# Patient Record
Sex: MALE | Race: WHITE | Employment: OTHER | URBAN - METROPOLITAN AREA
[De-identification: names, ages, dates, MRNs, and addresses within clinical notes are randomized per-mention and may not be internally consistent; named-entity substitution may affect disease eponyms.]

---

## 2017-06-06 ENCOUNTER — OFFICE VISIT (OUTPATIENT)
Dept: UROLOGY | Age: 77
End: 2017-06-06

## 2017-06-06 ENCOUNTER — HOSPITAL ENCOUNTER (OUTPATIENT)
Dept: LAB | Age: 77
Discharge: HOME | End: 2017-06-06
Payer: MEDICARE

## 2017-06-06 VITALS
DIASTOLIC BLOOD PRESSURE: 80 MMHG | BODY MASS INDEX: 42.38 KG/M2 | WEIGHT: 270 LBS | OXYGEN SATURATION: 97 % | HEART RATE: 79 BPM | TEMPERATURE: 98 F | HEIGHT: 67 IN | SYSTOLIC BLOOD PRESSURE: 110 MMHG

## 2017-06-06 DIAGNOSIS — R35.1 BPH ASSOCIATED WITH NOCTURIA: Primary | ICD-10-CM

## 2017-06-06 DIAGNOSIS — R31.0 GROSS HEMATURIA: ICD-10-CM

## 2017-06-06 DIAGNOSIS — N40.1 BPH ASSOCIATED WITH NOCTURIA: Primary | ICD-10-CM

## 2017-06-06 DIAGNOSIS — R35.1 BPH ASSOCIATED WITH NOCTURIA: ICD-10-CM

## 2017-06-06 DIAGNOSIS — N40.1 BPH ASSOCIATED WITH NOCTURIA: ICD-10-CM

## 2017-06-06 LAB
ERYTHROCYTE [DISTWIDTH] IN BLOOD BY AUTOMATED COUNT: 13.9 % (ref 11.6–14.5)
HCT VFR BLD AUTO: 43.8 % (ref 36–48)
HGB BLD-MCNC: 14.3 G/DL (ref 13–16)
MCH RBC QN AUTO: 31.2 PG (ref 24–34)
MCHC RBC AUTO-ENTMCNC: 32.6 G/DL (ref 31–37)
MCV RBC AUTO: 95.4 FL (ref 74–97)
PLATELET # BLD AUTO: 255 K/UL (ref 135–420)
PMV BLD AUTO: 10.6 FL (ref 9.2–11.8)
PSA SERPL-MCNC: 0.4 NG/ML (ref 0–4)
RBC # BLD AUTO: 4.59 M/UL (ref 4.7–5.5)
WBC # BLD AUTO: 6.4 K/UL (ref 4.6–13.2)

## 2017-06-06 PROCEDURE — 84153 ASSAY OF PSA TOTAL: CPT | Performed by: UROLOGY

## 2017-06-06 PROCEDURE — 85027 COMPLETE CBC AUTOMATED: CPT | Performed by: UROLOGY

## 2017-06-06 PROCEDURE — 87086 URINE CULTURE/COLONY COUNT: CPT | Performed by: UROLOGY

## 2017-06-06 NOTE — PROGRESS NOTES
Mr. Dann Ahuja has a reminder for a \"due or due soon\" health maintenance. I have asked that he contact his primary care provider for follow-up on this health maintenance.

## 2017-06-06 NOTE — PROGRESS NOTES
Earle Tipton. 77 y.o. male     Mr. Malika Friedman seen today for evaluation of gross hematuria-on Coumadin for coronary artery disease, peripheral vascular disease, and chronic atrial fibrillation  Patient has burgundy colored urine for the past 3 days-no urgency frequency or dysuria no fever flank pain    Similar to gross hematuria experienced in 2016 with CT scan in July 2016 showing normal kidneys and ureters and cystoscopy showing ingested and friable blood vessels in the bladder outle  Abdominal aneurysm found at that time subsequently stented in November 2016 by Dr. Shad Wells    On alpha-blocker treatment with Flomax for relief of symptomatic BPH       symptomatic BPH initiating alpha-blocker therapy 3 months ago after evaluation of gross hematuria finding an abdominal aortic aneurysm in bilateral iliac aneurysms on CT scan imaging-refer to vascular surgery which has stented the abnormal blood vessels-patient is anticoagulated with Coumadin  Reports forceful urinary stream with good control-nocturia has subsided entirely-very happy with alpha-blocker treatment  CT scan imaging of the abdomen and pelvis with 7 July 2016 shows normal kidneys ureters and bladder-saccular aneurysms of the abdominal aorta and common iliac arteries are noted incidentally-Images have been reviewed today with the patient on PACS-Downloaded for scanning into the electronic medical record-laser printed for the patient      Patient has no symptoms of claudication-no episodes of abdominal pain      Creatinine 0.7 on 7 July 2016  PSA 0.5 in March 2016      Review of Systems:    CNS: no seizure syncope headaches or dizziness  Respiratory: wheezing no shortness of breath/COPD  Cardiovascular:coronary artery disease/hypertension  Intestinal:GERD no diarrhea no constipation  Urinary: urinary urgency and frequency  Skeletal: large joint arthritis  Endocrine:diabetes x20 years  Other:massive obesity                                     Nonsmoker x25 years                    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  tamsulosin (FLOMAX) 0.4 mg capsule Take 0.4 mg by mouth daily.  furosemide (LASIX) 40 mg tablet 40 mg.      lisinopril (PRINIVIL, ZESTRIL) 10 mg tablet 10 mg.      isosorbide mononitrate ER (IMDUR) 60 mg CR tablet 60 mg.      citalopram (CELEXA) 20 mg tablet   0    pravastatin (PRAVACHOL) 40 mg tablet 40 mg.      ranitidine (ZANTAC) 150 mg tablet   0    celecoxib (CELEBREX) 200 mg capsule 200 mg.      gemfibrozil (LOPID) 600 mg tablet 600 mg.      metFORMIN (GLUCOPHAGE) 1,000 mg tablet 1,000 mg.      metoprolol tartrate (LOPRESSOR) 25 mg tablet 25 mg.      gabapentin (NEURONTIN) 300 mg capsule Take 2 Caps by Mouth Every Night at Bedtime      warfarin (COUMADIN) 5 mg tablet       albuterol (PROVENTIL HFA, VENTOLIN HFA, PROAIR HFA) 90 mcg/actuation inhaler 2 Puffs.  multivitamin (ONE A DAY) tablet Take 1 Tab by mouth daily.  omega-3s-dha-epa-fish oil 300-1,000 mg cap Take  by mouth.  zinc 50 mg tab tablet Take  by mouth daily.  ascorbic acid, vitamin C, (VITAMIN C) 500 mg tablet Take 1,000 mg by mouth.  HYDROcodone-acetaminophen (NORCO) 5-325 mg per tablet Take 1 Tab by mouth every four (4) hours as needed. Max Daily Amount: 6 Tabs.  36 Tab 0       Past Medical History:   Diagnosis Date    CAD (coronary artery disease)     [facility]     Diabetes ([facility]     Heart failure (HCC)     CHF    Hypertension     Sleep apnea     cpap    Thromboembolus ([facility]     Leg after sx-1991      Past Surgical History:   Procedure Laterality Date    CARDIAC SURG PROCEDURE UNLIST      CABGx4 1991    CHEST SURGERY PROCEDURE UNLISTED      HX OTHER SURGICAL      3 stents cardiac 1996    PELVIS/HIP JOINT SURGERY UNLISTED       Family History   Problem Relation Age of Onset    Heart Disease Father     Hypertension Father     Cancer Sister         Physical Examination: Markedly overweight mature male in no apparent distress    Abdomen is nontender  Back-no percussion CVA tenderness      Urinalysis: Dark burgundy colored urine/++++heme    Impression: Gross painless hematuria on Coumadin anticoagulation                        -Symptomatic BPH responding favorably to alpha-blocker                                Plan: INR today             PSA/CBC today             C&S urine today    Advised light physical activity    rtc 1 week-may need endoscopic fulguration of bleeding sites gross hematuria persists/discussed with patient      More than 1/2 of this 25 minute visit was spent in counselling and coordination of care, as described above. Camilo Perez MD  -electronically signed-    PLEASE NOTE:  This document has been produced using voice recognition software. Unrecognized errors in transcription may be present.

## 2017-06-06 NOTE — PATIENT INSTRUCTIONS

## 2017-06-06 NOTE — MR AVS SNAPSHOT
Visit Information Date & Time Provider Department Dept. Phone Encounter #  
 6/6/2017 10:00 AM Camilo Perez, [address] [id number] Your Appointments 10/25/2017  9:45 AM  
Office Visit with Camilo Perez MD  
[facility] [address]) Appt Note: check up [address] [facility] [address] [address] [phone] [address] [facility] [address] Upcoming Health Maintenance Date Due  
 LIPID PANEL Q1 1940 FOOT EXAM Q1 8/29/1950 MICROALBUMIN Q1 8/29/1950 EYE EXAM RETINAL OR DILATED Q1 8/29/1951 DTaP/Tdap/Td series (1 - Tdap) 8/29/1962 ZOSTER VACCINE AGE 60> 8/29/2001 GLAUCOMA SCREENING Q2Y 8/29/2006 Pneumococcal 65+ Low/Medium Risk (1 of 2 - PCV13) 8/29/2006 MEDICARE YEARLY EXAM 8/29/2006 HEMOGLOBIN A1C Q6M 3/9/2017 INFLUENZA AGE 9 TO ADULT 8/1/2017 Allergies as of 6/6/2017  Review Complete On: 6/6/2017 By: Jacquelyne Frankel Severity Noted Reaction Type Reactions Latex, Natural Rubber Medium 06/30/2016    Itching Penicillins High 06/30/2016    Hives, Rash, Swelling Oxycodone Medium 06/30/2016    Other (comments) ''Makes real loopy'' Aspirin  06/30/2016    Other (comments)  
 '' Taking coumadin or Warfarin\" Current Immunizations  Never Reviewed No immunizations on file. Not reviewed this visit You Were Diagnosed With   
  
 Codes Comments BPH associated with nocturia    -  Primary ICD-10-CM: N40.1, R35.1 ICD-9-CM: 600.01, 788.43 Gross hematuria     ICD-10-CM: R31.0 ICD-9-CM: 599.71 Vitals BP Pulse Temp Height(growth percentile) Weight(growth percentile) SpO2  
 110/80 (BP 1 Location: Left arm, BP Patient Position: Sitting) 79 98 °F (36.7 °C) (Oral) 5' 7\" (1.702 m) 270 lb (122.5 kg) 97% BMI Smoking Status 42.29 kg/m2 Former Smoker BMI and BSA Data Body Mass Index Body Surface Area  
 42.29 kg/m 2 2.41 m 2 Preferred Pharmacy Pharmacy Name Phone [facility] [address] [id number]) [phone] Your Updated Medication List  
  
   
This list is accurate as of: 6/6/17 10:46 AM.  Always use your most recent med list.  
  
  
  
  
 albuterol 90 mcg/actuation inhaler Commonly known as:  PROVENTIL HFA, VENTOLIN HFA, PROAIR HFA  
2 Puffs. ascorbic acid (vitamin C) 500 mg tablet Commonly known as:  VITAMIN C Take 1,000 mg by mouth. CARDIZEM  mg ER capsule Generic drug:  dilTIAZem CD Take 180 mg by mouth daily. celecoxib 200 mg capsule Commonly known as:  CELEBREX  
200 mg.  
  
 citalopram 20 mg tablet Commonly known as:  CELEXA  
  
 furosemide 40 mg tablet Commonly known as:  LASIX 40 mg.  
  
 gabapentin 300 mg capsule Commonly known as:  NEURONTIN Take 2 Caps by Mouth Every Night at Bedtime  
  
 gemfibrozil 600 mg tablet Commonly known as:  LOPID  
600 mg. HYDROcodone-acetaminophen 5-325 mg per tablet Commonly known as:  [place] Take 1 Tab by mouth every four (4) hours as needed. Max Daily Amount: 6 Tabs. isosorbide mononitrate ER 60 mg CR tablet Commonly known as:  IMDUR  
60 mg.  
  
 lisinopril 10 mg tablet Commonly known as:  PRINIVIL, ZESTRIL  
10 mg.  
  
 metFORMIN 1,000 mg tablet Commonly known as:  GLUCOPHAGE  
1,000 mg.  
  
 metoprolol tartrate 25 mg tablet Commonly known as:  LOPRESSOR  
25 mg.  
  
 multivitamin tablet Commonly known as:  ONE A DAY Take 1 Tab by mouth daily. omega-3s-dha-epa-fish oil 300-1,000 mg Cap Take  by mouth.  
  
 pravastatin 40 mg tablet Commonly known as:  PRAVACHOL 40 mg.  
  
 raNITIdine 150 mg tablet Commonly known as:  ZANTAC  
  
 tamsulosin 0.4 mg capsule Commonly known as:  FLOMAX Take 0.4 mg by mouth daily. warfarin 5 mg tablet Commonly known as:  COUMADIN  
  
 zinc 50 mg Tab tablet Take  by mouth daily. We Performed the Following ME COLLECTION VENOUS BLOOD,VENIPUNCTURE [id number] CPT(R)] To-Do List   
 06/06/2017 Lab:  CBC W/O DIFF   
  
 06/06/2017 Lab:  PROSTATE SPECIFIC AG (PSA) Patient Instructions Blood in the Urine: Care Instructions Your Care Instructions Blood in the urine, or hematuria, may make the urine look red, brown, or pink. There may be blood every time you urinate or just from time to time. You cannot always see blood in the urine, but it will show up in a urine test. 
Blood in the urine may be serious. It should always be checked by a doctor. Your doctor may recommend more tests, including an X-ray, a CT scan, or a cystoscopy (which lets a doctor look inside the urethra and bladder). Blood in the urine can be a sign of another problem. Common causes are bladder infections and kidney stones. An injury to your groin or your genital area can also cause bleeding in the urinary tract. Very hard exercisesuch as running a marathoncan cause blood in the urine. Blood in the urine can also be a sign of kidney disease or cancer in the bladder or kidney. Many cases of blood in the urine are caused by a harmless condition that runs in families. This is called benign familial hematuria. It does not need any treatment. Sometimes your urine may look red or brown even though it does not contain blood. For example, not getting enough fluids (dehydration), taking certain medicines, or having a liver problem can change the color of your urine. Eating foods such as beets, rhubarb, or blackberries or foods with red food coloring can make your urine look red or pink. Follow-up care is a key part of your treatment and safety. Be sure to make and go to all appointments, and call your doctor if you are having problems.  It's also a good idea to know your test results and keep a list of the medicines you take. When should you call for help? Call your doctor now or seek immediate medical care if: 
· You have symptoms of a urinary infection. For example: ¨ You have pus in your urine. ¨ You have pain in your back just below your rib cage. This is called flank pain. ¨ You have a fever, chills, or body aches. ¨ It hurts to urinate. ¨ You have groin or belly pain. · You have more blood in your urine. Watch closely for changes in your health, and be sure to contact your doctor if: 
· You have new urination problems. · You do not get better as expected. Where can you learn more? Go to http://jasmin-jose juan.info/. Enter C517 in the search box to learn more about \"Blood in the Urine: Care Instructions. \" Current as of: August 12, 2016 Content Version: 11.2 © 4951-9527 goodideazs. Care instructions adapted under license by [employer] (which disclaims liability or warranty for this information). If you have questions about a medical condition or this instruction, always ask your healthcare professional. [place] [address] any warranty or liability for your use of this information. Please provide this summary of care documentation to your next provider. Your primary care clinician is listed as Ab Connor. If you have any questions after today's visit, please call [phone].

## 2017-06-08 LAB
BACTERIA SPEC CULT: NORMAL
SERVICE CMNT-IMP: NORMAL

## 2017-06-13 ENCOUNTER — OFFICE VISIT (OUTPATIENT)
Dept: UROLOGY | Age: 77
End: 2017-06-13

## 2017-06-13 VITALS
WEIGHT: 270 LBS | BODY MASS INDEX: 42.38 KG/M2 | TEMPERATURE: 97.5 F | SYSTOLIC BLOOD PRESSURE: 132 MMHG | OXYGEN SATURATION: 95 % | HEIGHT: 67 IN | HEART RATE: 57 BPM | DIASTOLIC BLOOD PRESSURE: 70 MMHG

## 2017-06-13 DIAGNOSIS — N40.1 BPH ASSOCIATED WITH NOCTURIA: Primary | ICD-10-CM

## 2017-06-13 DIAGNOSIS — R35.1 BPH ASSOCIATED WITH NOCTURIA: Primary | ICD-10-CM

## 2017-06-13 DIAGNOSIS — R31.0 GROSS HEMATURIA: ICD-10-CM

## 2017-06-13 LAB
BILIRUB UR QL STRIP: NEGATIVE
GLUCOSE UR-MCNC: NEGATIVE MG/DL
KETONES P FAST UR STRIP-MCNC: NEGATIVE MG/DL
PH UR STRIP: 6.5 [PH] (ref 4.6–8)
PROT UR QL STRIP: NEGATIVE MG/DL
SP GR UR STRIP: 1.02 (ref 1–1.03)
UA UROBILINOGEN AMB POC: NORMAL (ref 0.2–1)
URINALYSIS CLARITY POC: CLEAR
URINALYSIS COLOR POC: YELLOW
URINE BLOOD POC: NEGATIVE
URINE LEUKOCYTES POC: NEGATIVE
URINE NITRITES POC: NEGATIVE

## 2017-06-13 RX ORDER — DILTIAZEM HYDROCHLORIDE 180 MG/1
TABLET, EXTENDED RELEASE ORAL
Refills: 3 | COMMUNITY
Start: 2017-04-29

## 2017-06-13 RX ORDER — TAMSULOSIN HYDROCHLORIDE 0.4 MG/1
0.4 CAPSULE ORAL DAILY
Qty: 90 CAP | Refills: 3 | Status: SHIPPED | OUTPATIENT
Start: 2017-06-13

## 2017-06-13 NOTE — PROGRESS NOTES
Mr. Brianna Bro has a reminder for a \"due or due soon\" health maintenance. I have asked that he contact his primary care provider for follow-up on this health maintenance.

## 2017-06-13 NOTE — PATIENT INSTRUCTIONS

## 2017-06-13 NOTE — MR AVS SNAPSHOT
Visit Information Date & Time Provider Department Dept. Phone Encounter #  
 6/13/2017  9:45 AM Pratik Michael, [facility] [phone] [id number] Your Appointments 10/25/2017  9:45 AM  
Office Visit with Pratik Michael MD  
[facility] [address]) Appt Note: check up [address] [facility] [address] [address]  
[phone] [address] [facility] [address] Upcoming Health Maintenance Date Due  
 LIPID PANEL Q1 1940 FOOT EXAM Q1 8/29/1950 MICROALBUMIN Q1 8/29/1950 EYE EXAM RETINAL OR DILATED Q1 8/29/1951 DTaP/Tdap/Td series (1 - Tdap) 8/29/1962 ZOSTER VACCINE AGE 60> 8/29/2001 GLAUCOMA SCREENING Q2Y 8/29/2006 Pneumococcal 65+ Low/Medium Risk (1 of 2 - PCV13) 8/29/2006 MEDICARE YEARLY EXAM 8/29/2006 HEMOGLOBIN A1C Q6M 3/9/2017 INFLUENZA AGE 9 TO ADULT 8/1/2017 Allergies as of 6/13/2017  Review Complete On: 6/13/2017 By: Pratik Michael MD  
  
 Severity Noted Reaction Type Reactions Latex, Natural Rubber Medium 06/30/2016    Itching Penicillins High 06/30/2016    Hives, Rash, Swelling Oxycodone Medium 06/30/2016    Other (comments) ''Makes real loopy'' Aspirin  06/30/2016    Other (comments) Other reaction(s): unknown Pt is taking coumadin 
'' Taking coumadin or Warfarin\" Current Immunizations  Never Reviewed No immunizations on file. Not reviewed this visit You Were Diagnosed With   
  
 Codes Comments BPH associated with nocturia    -  Primary ICD-10-CM: N40.1, R35.1 ICD-9-CM: 600.01, 788.43 Gross hematuria     ICD-10-CM: R31.0 ICD-9-CM: 599.71 Vitals BP Pulse Temp Height(growth percentile) Weight(growth percentile) SpO2  
 132/70 (BP 1 Location: Left arm, BP Patient Position: Sitting) (!) 57 97.5 °F (36.4 °C) 5' 7\" (1.702 m) 270 lb (122.5 kg) 95% BMI Smoking Status 42.29 kg/m2 Former Smoker BMI and BSA Data Body Mass Index Body Surface Area  
 42.29 kg/m 2 2.41 m 2 Preferred Pharmacy Pharmacy Name Phone [facility] [address]) [phone] Your Updated Medication List  
  
   
This list is accurate as of: 6/13/17 10:40 AM.  Always use your most recent med list.  
  
  
  
  
 albuterol 90 mcg/actuation inhaler Commonly known as:  PROVENTIL HFA, VENTOLIN HFA, PROAIR HFA  
2 Puffs. ascorbic acid (vitamin C) 500 mg tablet Commonly known as:  VITAMIN C Take 1,000 mg by mouth. * CARDIZEM  mg ER capsule Generic drug:  dilTIAZem CD Take 180 mg by mouth daily. * MATZIM  mg Tb24 tablet Generic drug:  dilTIAZem ER TK 1 T PO QD  
  
 celecoxib 200 mg capsule Commonly known as:  CELEBREX  
200 mg.  
  
 citalopram 20 mg tablet Commonly known as:  CELEXA  
  
 furosemide 40 mg tablet Commonly known as:  LASIX 40 mg.  
  
 gabapentin 300 mg capsule Commonly known as:  NEURONTIN Take 2 Caps by Mouth Every Night at Bedtime  
  
 gemfibrozil 600 mg tablet Commonly known as:  LOPID  
600 mg. HYDROcodone-acetaminophen 5-325 mg per tablet Commonly known as:  [address] Take 1 Tab by mouth every four (4) hours as needed. Max Daily Amount: 6 Tabs. isosorbide mononitrate ER 60 mg CR tablet Commonly known as:  IMDUR  
60 mg.  
  
 lisinopril 10 mg tablet Commonly known as:  PRINIVIL, ZESTRIL  
10 mg.  
  
 metFORMIN 1,000 mg tablet Commonly known as:  GLUCOPHAGE  
1,000 mg.  
  
 metoprolol tartrate 25 mg tablet Commonly known as:  LOPRESSOR  
25 mg.  
  
 multivitamin tablet Commonly known as:  ONE A DAY Take 1 Tab by mouth daily. omega-3s-dha-epa-fish oil 300-1,000 mg Cap Take  by mouth.  
  
 pravastatin 40 mg tablet Commonly known as:  PRAVACHOL 40 mg.  
  
 raNITIdine 150 mg tablet Commonly known as:  ZANTAC * tamsulosin 0.4 mg capsule Commonly known as:  FLOMAX Take 0.4 mg by mouth daily. * tamsulosin 0.4 mg capsule Commonly known as:  FLOMAX Take 1 Cap by mouth daily. Indications: SYMPTOMATIC BENIGN PROSTATIC HYPERPLASIA  
  
 warfarin 5 mg tablet Commonly known as:  COUMADIN  
  
 zinc 50 mg Tab tablet Take  by mouth daily. * Notice: This list has 4 medication(s) that are the same as other medications prescribed for you. Read the directions carefully, and ask your doctor or other care provider to review them with you. Prescriptions Sent to Pharmacy Refills  
 tamsulosin (FLOMAX) 0.4 mg capsule 3 Sig: Take 1 Cap by mouth daily. Indications: SYMPTOMATIC BENIGN PROSTATIC HYPERPLASIA Class: Normal  
 Pharmacy: [facility] [address] #: [phone] Route: Oral  
  
We Performed the Following AMB POC URINALYSIS DIP STICK AUTO W/O MICRO [25423 CPT(R)] Patient Instructions Blood in the Urine: Care Instructions Your Care Instructions Blood in the urine, or hematuria, may make the urine look red, brown, or pink. There may be blood every time you urinate or just from time to time. You cannot always see blood in the urine, but it will show up in a urine test. 
Blood in the urine may be serious. It should always be checked by a doctor. Your doctor may recommend more tests, including an X-ray, a CT scan, or a cystoscopy (which lets a doctor look inside the urethra and bladder). Blood in the urine can be a sign of another problem. Common causes are bladder infections and kidney stones. An injury to your groin or your genital area can also cause bleeding in the urinary tract. Very hard exercisesuch as running a marathoncan cause blood in the urine.  Blood in the urine can also be a sign of kidney disease or cancer in the bladder or kidney. Many cases of blood in the urine are caused by a harmless condition that runs in families. This is called benign familial hematuria. It does not need any treatment. Sometimes your urine may look red or brown even though it does not contain blood. For example, not getting enough fluids (dehydration), taking certain medicines, or having a liver problem can change the color of your urine. Eating foods such as beets, rhubarb, or blackberries or foods with red food coloring can make your urine look red or pink. Follow-up care is a key part of your treatment and safety. Be sure to make and go to all appointments, and call your doctor if you are having problems. It's also a good idea to know your test results and keep a list of the medicines you take. When should you call for help? Call your doctor now or seek immediate medical care if: 
· You have symptoms of a urinary infection. For example: ¨ You have pus in your urine. ¨ You have pain in your back just below your rib cage. This is called flank pain. ¨ You have a fever, chills, or body aches. ¨ It hurts to urinate. ¨ You have groin or belly pain. · You have more blood in your urine. Watch closely for changes in your health, and be sure to contact your doctor if: 
· You have new urination problems. · You do not get better as expected. Where can you learn more? Go to http://jasmin-jose juan.info/. Enter D145 in the search box to learn more about \"Blood in the Urine: Care Instructions. \" Current as of: August 12, 2016 Content Version: 11.2 © 1577-8566 MovieSet. Care instructions adapted under license by [employer] (which disclaims liability or warranty for this information). If you have questions about a medical condition or this instruction, always ask your healthcare professional. [address] any warranty or liability for your use of this information. Please provide this summary of care documentation to your next provider. Your primary care clinician is listed as Flores English. If you have any questions after today's visit, please call [phone].

## 2017-06-14 NOTE — PROGRESS NOTES
Chance Paul. 77 y.o. male     Mr. Freddy Martinez seen today for follow-up gross hematuria on Coumadin anticoagulation  Patient's INR has now been decreased to 2.6/INR 7.7 when experiencing gross hematuria    Patient is voiding grossly clear urine at this time  gross hematuria-on Coumadin for coronary artery disease, peripheral vascular disease, and chronic atrial fibrillation  Patient has burgundy colored urine for the past 3 days-no urgency frequency or dysuria no fever flank pain     Similar to gross hematuria experienced in 2016 with CT scan in July 2016 showing normal kidneys and ureters and cystoscopy showing ingested and friable blood vessels in the bladder outle  Abdominal aneurysm found at that time subsequently stented in November 2016 by Dr. Clarissa Narayanan     On alpha-blocker treatment with Flomax for relief of symptomatic BPH         symptomatic BPH initiating alpha-blocker therapy 3 months ago after evaluation of gross hematuria finding an abdominal aortic aneurysm in bilateral iliac aneurysms on CT scan imaging-refer to vascular surgery which has stented the abnormal blood vessels-patient is anticoagulated with Coumadin  Reports forceful urinary stream with good control-nocturia has subsided entirely-very happy with alpha-blocker treatment  CT scan imaging of the abdomen and pelvis with 7 July 2016 shows normal kidneys ureters and bladder-saccular aneurysms of the abdominal aorta and common iliac arteries are noted incidentally-Images have been reviewed today with the patient on PACS-Downloaded for scanning into the electronic medical record-laser printed for the patient      Patient has no symptoms of claudication-no episodes of abdominal pain      Creatinine 0.7 on 7 July 2016  PSA 0.5 in March 2016  PSA 0.4 on 6 June 2017      Review of Systems:    CNS: no seizure syncope headaches or dizziness  Respiratory: wheezing no shortness of breath/COPD  Cardiovascular:coronary artery disease/hypertension  Intestinal:GERD no diarrhea no constipation  Urinary: urinary urgency and frequency  Skeletal: large joint arthritis  Endocrine:diabetes x20 years  Other:massive obesity                                     Nonsmoker x25 years                    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 Indications: SYMPTOMATIC BENIGN PROSTATIC HYPERPLASIA 90 Cap 3    diltiazem CD (CARDIZEM CD) 180 mg ER capsule Take 180 mg by mouth daily.  tamsulosin (FLOMAX) 0.4 mg capsule Take 0.4 mg by mouth daily.  furosemide (LASIX) 40 mg tablet 40 mg.      lisinopril (PRINIVIL, ZESTRIL) 10 mg tablet 10 mg.      isosorbide mononitrate ER (IMDUR) 60 mg CR tablet 60 mg.      citalopram (CELEXA) 20 mg tablet   0    pravastatin (PRAVACHOL) 40 mg tablet 40 mg.      ranitidine (ZANTAC) 150 mg tablet   0    celecoxib (CELEBREX) 200 mg capsule 200 mg.      gemfibrozil (LOPID) 600 mg tablet 600 mg.      metFORMIN (GLUCOPHAGE) 1,000 mg tablet 1,000 mg.      metoprolol tartrate (LOPRESSOR) 25 mg tablet 25 mg.      gabapentin (NEURONTIN) 300 mg capsule Take 2 Caps by Mouth Every Night at Bedtime      warfarin (COUMADIN) 5 mg tablet       multivitamin (ONE A DAY) tablet Take 1 Tab by mouth daily.  omega-3s-dha-epa-fish oil 300-1,000 mg cap Take  by mouth.  zinc 50 mg tab tablet Take  by mouth daily.  ascorbic acid, vitamin C, (VITAMIN C) 500 mg tablet Take 1,000 mg by mouth.       MATZIM  mg Tb24 tablet TK 1 T PO QD  3    HYDROcodone-acetaminophen (NORCO) 5-325 mg per tablet Take 1 Tab by mouth every four (4) hours as needed. Max Daily Amount: 6 Tabs. 40 Tab 0    albuterol (PROVENTIL HFA, VENTOLIN HFA, PROAIR HFA) 90 mcg/actuation inhaler 2 Puffs. Past Medical History:   Diagnosis Date    CAD (coronary artery disease)     [facility] [address]     Diabetes ([facility] [address]     Heart failure (HCC)     CHF    Hypertension     Sleep apnea     cpap    Thromboembolus ([facility] [address]     Leg after sx-1991      Past Surgical History:   Procedure Laterality Date    CARDIAC SURG PROCEDURE UNLIST      CABGx4 1991    CHEST SURGERY PROCEDURE UNLISTED      HX OTHER SURGICAL      3 stents cardiac 1996    PELVIS/HIP JOINT SURGERY UNLISTED       Family History   Problem Relation Age of Onset    Heart Disease Father     Hypertension Father     Cancer Sister         Physical Examination: Well-nourished mature male in no apparent distress    Urinalysis: Negative dipstick/nitrite negative    Impression: Gross hematuria secondary to excessive anticoagulation from Coumadin/corrected with Coumadin dose adjustment                       -Symptomatic BPH responding favorably to alpha-blocker treatment with Flomax      Plan: Flomax 0.4 mg daily      rtc 6 mo VANESSA/PVR/      More than 1/2 of this 15 minute visit was spent in counselling and coordination of care, as described above. Leopoldo Porras MD  -electronically signed-    PLEASE NOTE:  This document has been produced using voice recognition software. Unrecognized errors in transcription may be present.

## 2017-10-25 ENCOUNTER — OFFICE VISIT (OUTPATIENT)
Dept: UROLOGY | Age: 77
End: 2017-10-25

## 2017-10-25 VITALS
DIASTOLIC BLOOD PRESSURE: 73 MMHG | OXYGEN SATURATION: 91 % | HEART RATE: 59 BPM | SYSTOLIC BLOOD PRESSURE: 125 MMHG | HEIGHT: 67 IN | BODY MASS INDEX: 43.47 KG/M2 | WEIGHT: 277 LBS

## 2017-10-25 DIAGNOSIS — N40.1 BPH ASSOCIATED WITH NOCTURIA: Primary | ICD-10-CM

## 2017-10-25 DIAGNOSIS — R35.1 BPH ASSOCIATED WITH NOCTURIA: Primary | ICD-10-CM

## 2017-10-25 RX ORDER — TAMSULOSIN HYDROCHLORIDE 0.4 MG/1
0.4 CAPSULE ORAL DAILY
Qty: 90 CAP | Refills: 3 | Status: SHIPPED | OUTPATIENT
Start: 2017-10-25 | End: 2018-07-12 | Stop reason: SDUPTHER

## 2017-10-25 NOTE — PATIENT INSTRUCTIONS
Benign Prostatic Hyperplasia: Care Instructions  Your Care Instructions    Benign prostatic hyperplasia, or BPH, is an enlarged prostate gland. The prostate is a small gland that makes some of the fluid in semen. Prostate enlargement happens to almost all men as they age. It is usually not serious. BPH does not cause prostate cancer. As the prostate gets bigger, it may partly block the flow of urine. You may have a hard time getting a urine stream started or completely stopped. BPH can cause dribbling. You may have a weak urine stream, or you may have to urinate more often than you used to, especially at night. Most men find these problems easy to manage. You do not need treatment unless your symptoms bother you a lot or you have other problems, such as bladder infections or stones. In these cases, medicines may help. Surgery is not needed unless the urine flow is blocked or the symptoms do not get better with medicine. Follow-up care is a key part of your treatment and safety. Be sure to make and go to all appointments, and call your doctor if you are having problems. It's also a good idea to know your test results and keep a list of the medicines you take. How can you care for yourself at home? · Take plenty of time to urinate. Try to relax. · Try \"double voiding. \" Urinate as much you can, relax for a few moments, and then try to urinate again. · Sit on the toilet to urinate. · Read or think of other things while you are waiting. · Turn on a faucet, or try to picture running water. Some men find that this helps get their urine flowing. · If dribbling is a problem, wash your penis daily to avoid skin irritation and infection. · Avoid caffeine and alcohol. These drinks will increase how often you need to urinate. Spread your fluid intake throughout the day. If the urge to urinate often wakes you at night, limit your fluid intake in the evening. Urinate right before you go to bed.   · Many over-the-counter cold and allergy medicines can make the symptoms of BPH worse. Avoid antihistamines, decongestants, and allergy pills, if you can. Read the warnings on the package. · If you take any prescription medicines, especially tranquilizers or antidepressants, ask your doctor or pharmacist whether they can cause urination problems. There may be other medicines you can use that do not cause urinary problems. · Be safe with medicines. Take your medicines exactly as prescribed. Call your doctor if you think you are having a problem with your medicine. When should you call for help? Call your doctor now or seek immediate medical care if:  · You cannot urinate at all. · You have symptoms of a urinary infection. For example:  ¨ You have blood or pus in your urine. ¨ You have pain in your back just below your rib cage. This is called flank pain. ¨ You have a fever, chills, or body aches. ¨ It hurts to urinate. ¨ You have groin or belly pain. Watch closely for changes in your health, and be sure to contact your doctor if:  · It hurts when you ejaculate. · Your urinary problems get a lot worse or bother you a lot. Where can you learn more? Go to http://jasminVupenjose juan.info/. Enter A150 in the search box to learn more about \"Benign Prostatic Hyperplasia: Care Instructions. \"  Current as of: March 14, 2017  Content Version: 11.3  © 0859-3449 [employer]. Care instructions adapted under license by [employer] (which disclaims liability or warranty for this information). If you have questions about a medical condition or this instruction, always ask your healthcare professional. [address] any warranty or liability for your use of this information. Preventing Falls: Care Instructions  Your Care Instructions  Getting around your home safely can be a challenge if you have injuries or health problems that make it easy for you to fall.  Loose rugs and furniture in walkways are among the dangers for many older people who have problems walking or who have poor eyesight. People who have conditions such as arthritis, osteoporosis, or dementia also have to be careful not to fall. You can make your home safer with a few simple measures. Follow-up care is a key part of your treatment and safety. Be sure to make and go to all appointments, and call your doctor if you are having problems. It's also a good idea to know your test results and keep a list of the medicines you take. How can you care for yourself at home? Taking care of yourself  · You may get dizzy if you do not drink enough water. To prevent dehydration, drink plenty of fluids, enough so that your urine is light yellow or clear like water. Choose water and other caffeine-free clear liquids. If you have kidney, heart, or liver disease and have to limit fluids, talk with your doctor before you increase the amount of fluids you drink. · Exercise regularly to improve your strength, muscle tone, and balance. Walk if you can. Swimming may be a good choice if you cannot walk easily. · Have your vision and hearing checked each year or any time you notice a change. If you have trouble seeing and hearing, you might not be able to avoid objects and could lose your balance. · Know the side effects of the medicines you take. Ask your doctor or pharmacist whether the medicines you take can affect your balance. Sleeping pills or sedatives can affect your balance. · Limit the amount of alcohol you drink. Alcohol can impair your balance and other senses. · Ask your doctor whether calluses or corns on your feet need to be removed. If you wear loose-fitting shoes because of calluses or corns, you can lose your balance and fall. · Talk to your doctor if you have numbness in your feet. Preventing falls at home  · Remove raised doorway thresholds, throw rugs, and clutter.  Repair loose carpet or raised areas in the floor. · Move furniture and electrical cords to keep them out of walking paths. · Use nonskid floor wax, and wipe up spills right away, especially on ceramic tile floors. · If you use a walker or cane, put rubber tips on it. If you use crutches, clean the bottoms of them regularly with an abrasive pad, such as steel wool. · Keep your house well lit, especially [place], and outside walkways. Use night-lights in areas such as hallways and bathrooms. Add extra light switches or use remote switches (such as switches that go on or off when you clap your hands) to make it easier to turn lights on if you have to get up during the night. · Install sturdy handrails on stairways. · Move items in your cabinets so that the things you use a lot are on the lower shelves (about waist level). · Keep a cordless phone and a flashlight with new batteries by your bed. If possible, put a phone in each of the main rooms of your house, or carry a cell phone in case you fall and cannot reach a phone. Or, you can wear a device around your neck or wrist. You push a button that sends a signal for help. · Wear low-heeled shoes that fit well and give your feet good support. Use footwear with nonskid soles. Check the heels and soles of your shoes for wear. Repair or replace worn heels or soles. · Do not wear socks without shoes on wood floors. · Walk on the grass when the sidewalks are slippery. If you live in an area that gets snow and ice in the winter, sprinkle salt on slippery steps and sidewalks. Preventing falls in the bath  · Install grab bars and nonskid mats inside and outside your shower or tub and near the toilet and sinks. · Use shower chairs and bath benches. · Use a hand-held shower head that will allow you to sit while showering.   · Get into a tub or shower by putting the weaker leg in first. Get out of a tub or shower with your strong side first.  · Repair loose toilet seats and consider installing a raised toilet seat to make getting on and off the toilet easier. · Keep your bathroom door unlocked while you are in the shower. Where can you learn more? Go to http://jasmin-jose juan.info/. Enter 0476 79 69 71 in the search box to learn more about \"Preventing Falls: Care Instructions. \"  Current as of: August 4, 2016  Content Version: 11.3  © 2473-6079 [employer]. Care instructions adapted under license by [employer] (which disclaims liability or warranty for this information). If you have questions about a medical condition or this instruction, always ask your healthcare professional. [address] any warranty or liability for your use of this information.

## 2017-10-25 NOTE — PROGRESS NOTES
Mr. Melissa Amador has a reminder for a \"due or due soon\" health maintenance. I have asked that he contact his primary care provider for follow-up on this health maintenance.

## 2017-10-25 NOTE — MR AVS SNAPSHOT
Visit Information Date & Time Provider Department Dept. Phone Encounter #  
 10/25/2017  9:45 AM Yulisa PintoNazanin [facility] [phone] [id number] Upcoming Health Maintenance Date Due  
 LIPID PANEL Q1 1940 FOOT EXAM Q1 8/29/1950 MICROALBUMIN Q1 8/29/1950 EYE EXAM RETINAL OR DILATED Q1 8/29/1951 DTaP/Tdap/Td series (1 - Tdap) 8/29/1962 ZOSTER VACCINE AGE 60> 6/29/2001 GLAUCOMA SCREENING Q2Y 8/29/2006 Pneumococcal 65+ Low/Medium Risk (1 of 2 - PCV13) 8/29/2006 MEDICARE YEARLY EXAM 8/29/2006 HEMOGLOBIN A1C Q6M 3/9/2017 INFLUENZA AGE 9 TO ADULT 8/1/2017 Allergies as of 10/25/2017  Review Complete On: 10/25/2017 By: Yulisa Pinto MD  
  
 Severity Noted Reaction Type Reactions Latex, Natural Rubber Medium 06/30/2016    Itching Penicillins High 06/30/2016    Hives, Rash, Swelling Oxycodone Medium 06/30/2016    Other (comments) ''Makes real loopy'' Aspirin  06/30/2016    Other (comments) Other reaction(s): unknown Pt is taking coumadin 
'' Taking coumadin or Warfarin\" Current Immunizations  Never Reviewed No immunizations on file. Not reviewed this visit You Were Diagnosed With   
  
 Codes Comments BPH associated with nocturia    -  Primary ICD-10-CM: N40.1, R35.1 ICD-9-CM: 600.01, 788.43 Vitals BP Pulse Height(growth percentile) Weight(growth percentile) SpO2 BMI  
 125/73 (BP 1 Location: Right arm, BP Patient Position: Sitting) (!) 59 5' 7\" (1.702 m) 277 lb (125.6 kg) 91% 43.38 kg/m2 Smoking Status Former Smoker Vitals History BMI and BSA Data Body Mass Index Body Surface Area  
 43.38 kg/m 2 2.44 m 2 Preferred Pharmacy Pharmacy Name Phone [address] [address] ([facility]) [phone] Your Updated Medication List  
  
   
 This list is accurate as of: 10/25/17 10:53 AM.  Always use your most recent med list.  
  
  
  
  
 albuterol 90 mcg/actuation inhaler Commonly known as:  PROVENTIL HFA, VENTOLIN HFA, PROAIR HFA  
2 Puffs. ascorbic acid (vitamin C) 500 mg tablet Commonly known as:  VITAMIN C Take 1,000 mg by mouth. * CARDIZEM  mg ER capsule Generic drug:  dilTIAZem CD Take 180 mg by mouth daily. * MATZIM  mg Tb24 tablet Generic drug:  dilTIAZem ER TK 1 T PO QD  
  
 celecoxib 200 mg capsule Commonly known as:  CELEBREX  
200 mg.  
  
 citalopram 20 mg tablet Commonly known as:  CELEXA  
  
 furosemide 40 mg tablet Commonly known as:  LASIX 40 mg.  
  
 gabapentin 300 mg capsule Commonly known as:  NEURONTIN Take 2 Caps by Mouth Every Night at Bedtime  
  
 gemfibrozil 600 mg tablet Commonly known as:  LOPID  
600 mg. HYDROcodone-acetaminophen 5-325 mg per tablet Commonly known as:  Esther Parisian Take 1 Tab by mouth every four (4) hours as needed. Max Daily Amount: 6 Tabs. isosorbide mononitrate ER 60 mg CR tablet Commonly known as:  IMDUR  
60 mg.  
  
 lisinopril 10 mg tablet Commonly known as:  PRINIVIL, ZESTRIL  
10 mg.  
  
 metFORMIN 1,000 mg tablet Commonly known as:  GLUCOPHAGE  
1,000 mg.  
  
 metoprolol tartrate 25 mg tablet Commonly known as:  LOPRESSOR  
25 mg.  
  
 multivitamin tablet Commonly known as:  ONE A DAY Take 1 Tab by mouth daily. omega-3s-dha-epa-fish oil 300-1,000 mg Cap Take  by mouth.  
  
 pravastatin 40 mg tablet Commonly known as:  PRAVACHOL 40 mg.  
  
 raNITIdine 150 mg tablet Commonly known as:  ZANTAC * tamsulosin 0.4 mg capsule Commonly known as:  FLOMAX Take 1 Cap by mouth daily. Indications: SYMPTOMATIC BENIGN PROSTATIC HYPERPLASIA * tamsulosin 0.4 mg capsule Commonly known as:  FLOMAX Take 1 Cap by mouth daily. Indications: Symptomatic Benign Prostatic Hyperplasia warfarin 5 mg tablet Commonly known as:  COUMADIN  
  
 zinc 50 mg Tab tablet Take  by mouth daily. * Notice: This list has 4 medication(s) that are the same as other medications prescribed for you. Read the directions carefully, and ask your doctor or other care provider to review them with you. Prescriptions Sent to Pharmacy Refills  
 tamsulosin (FLOMAX) 0.4 mg capsule 3 Sig: Take 1 Cap by mouth daily. Indications: Symptomatic Benign Prostatic Hyperplasia Class: Normal  
 Pharmacy: [facility] [address] [place] [address] #: [phone] Route: Oral  
  
We Performed the Following AMB POC URINALYSIS DIP STICK AUTO W/O MICRO [20751 CPT(R)] Patient Instructions Benign Prostatic Hyperplasia: Care Instructions Your Care Instructions Benign prostatic hyperplasia, or BPH, is an enlarged prostate gland. The prostate is a small gland that makes some of the fluid in semen. Prostate enlargement happens to almost all men as they age. It is usually not serious. BPH does not cause prostate cancer. As the prostate gets bigger, it may partly block the flow of urine. You may have a hard time getting a urine stream started or completely stopped. BPH can cause dribbling. You may have a weak urine stream, or you may have to urinate more often than you used to, especially at night. Most men find these problems easy to manage. You do not need treatment unless your symptoms bother you a lot or you have other problems, such as bladder infections or stones. In these cases, medicines may help. Surgery is not needed unless the urine flow is blocked or the symptoms do not get better with medicine. Follow-up care is a key part of your treatment and safety. Be sure to make and go to all appointments, and call your doctor if you are having problems.  It's also a good idea to know your test results and keep a list of the medicines you take. How can you care for yourself at home? · Take plenty of time to urinate. Try to relax. · Try \"double voiding. \" Urinate as much you can, relax for a few moments, and then try to urinate again. · Sit on the toilet to urinate. · Read or think of other things while you are waiting. · Turn on a faucet, or try to picture running water. Some men find that this helps get their urine flowing. · If dribbling is a problem, wash your penis daily to avoid skin irritation and infection. · Avoid caffeine and alcohol. These drinks will increase how often you need to urinate. Spread your fluid intake throughout the day. If the urge to urinate often wakes you at night, limit your fluid intake in the evening. Urinate right before you go to bed. · Many over-the-counter cold and allergy medicines can make the symptoms of BPH worse. Avoid antihistamines, decongestants, and allergy pills, if you can. Read the warnings on the package. · If you take any prescription medicines, especially tranquilizers or antidepressants, ask your doctor or pharmacist whether they can cause urination problems. There may be other medicines you can use that do not cause urinary problems. · Be safe with medicines. Take your medicines exactly as prescribed. Call your doctor if you think you are having a problem with your medicine. When should you call for help? Call your doctor now or seek immediate medical care if: 
· You cannot urinate at all. · You have symptoms of a urinary infection. For example: ¨ You have blood or pus in your urine. ¨ You have pain in your back just below your rib cage. This is called flank pain. ¨ You have a fever, chills, or body aches. ¨ It hurts to urinate. ¨ You have groin or belly pain. Watch closely for changes in your health, and be sure to contact your doctor if: 
· It hurts when you ejaculate. · Your urinary problems get a lot worse or bother you a lot. Where can you learn more? Go to http://jasmin-jose juan.info/. Enter O918 in the search box to learn more about \"Benign Prostatic Hyperplasia: Care Instructions. \" Current as of: March 14, 2017 Content Version: 11.3 © 4954-6589 [employer]. Care instructions adapted under license by "[employer] (which disclaims liability or warranty for this information). If you have questions about a medical condition or this instruction, always ask your healthcare professional. [address] any warranty or liability for your use of this information. Preventing Falls: Care Instructions Your Care Instructions Getting around your home safely can be a challenge if you have injuries or health problems that make it easy for you to fall. Loose rugs and furniture in walkways are among the dangers for many older people who have problems walking or who have poor eyesight. People who have conditions such as arthritis, osteoporosis, or dementia also have to be careful not to fall. You can make your home safer with a few simple measures. Follow-up care is a key part of your treatment and safety. Be sure to make and go to all appointments, and call your doctor if you are having problems. It's also a good idea to know your test results and keep a list of the medicines you take. How can you care for yourself at home? Taking care of yourself · You may get dizzy if you do not drink enough water. To prevent dehydration, drink plenty of fluids, enough so that your urine is light yellow or clear like water. Choose water and other caffeine-free clear liquids. If you have kidney, heart, or liver disease and have to limit fluids, talk with your doctor before you increase the amount of fluids you drink. · Exercise regularly to improve your strength, muscle tone, and balance. Walk if you can. Swimming may be a good choice if you cannot walk easily. · Have your vision and hearing checked each year or any time you notice a change. If you have trouble seeing and hearing, you might not be able to avoid objects and could lose your balance. · Know the side effects of the medicines you take. Ask your doctor or pharmacist whether the medicines you take can affect your balance. Sleeping pills or sedatives can affect your balance. · Limit the amount of alcohol you drink. Alcohol can impair your balance and other senses. · Ask your doctor whether calluses or corns on your feet need to be removed. If you wear loose-fitting shoes because of calluses or corns, you can lose your balance and fall. · Talk to your doctor if you have numbness in your feet. Preventing falls at home · Remove raised doorway thresholds, throw rugs, and clutter. Repair loose carpet or raised areas in the floor. · Move furniture and electrical cords to keep them out of walking paths. · Use nonskid floor wax, and wipe up spills right away, especially on ceramic tile floors. · If you use a walker or cane, put rubber tips on it. If you use crutches, clean the bottoms of them regularly with an abrasive pad, such as steel wool. · Keep your house well lit, especially Robb Decant, and outside walkways. Use night-lights in areas such as hallways and bathrooms. Add extra light switches or use remote switches (such as switches that go on or off when you clap your hands) to make it easier to turn lights on if you have to get up during the night. · Install sturdy handrails on stairways. · Move items in your cabinets so that the things you use a lot are on the lower shelves (about waist level). · Keep a cordless phone and a flashlight with new batteries by your bed. If possible, put a phone in each of the main rooms of your house, or carry a cell phone in case you fall and cannot reach a phone. Or, you can wear a device around your neck or wrist. You push a button that sends a signal for help. · Wear low-heeled shoes that fit well and give your feet good support. Use footwear with nonskid soles. Check the heels and soles of your shoes for wear. Repair or replace worn heels or soles. · Do not wear socks without shoes on wood floors. · Walk on the grass when the sidewalks are slippery. If you live in an area that gets snow and ice in the winter, sprinkle salt on slippery steps and sidewalks. Preventing falls in the bath · Install grab bars and nonskid mats inside and outside your shower or tub and near the toilet and sinks. · Use shower chairs and bath benches. · Use a hand-held shower head that will allow you to sit while showering. · Get into a tub or shower by putting the weaker leg in first. Get out of a tub or shower with your strong side first. 
· Repair loose toilet seats and consider installing a raised toilet seat to make getting on and off the toilet easier. · Keep your bathroom door unlocked while you are in the shower. Where can you learn more? Go to http://jasmin-jose juan.info/. Enter [id number] [phone] in the search box to learn more about \"Preventing Falls: Care Instructions. \" Current as of: August 4, 2016 Content Version: 11.3 © 7007-7751 [employer]. Care instructions adapted under license by [employer] (which disclaims liability or warranty for this information). If you have questions about a medical condition or this instruction, always ask your healthcare professional. [address] [place] [address] any warranty or liability for your use of this information. Introducing [facility]! Dear Ale Fleming: Thank you for requesting a [employer] account. Our records indicate that you have previously registered for a C[employer] account but its currently inactive. Please call our [employer] support line at [phone]. Additional Information If you have questions, please visit the Frequently Asked Questions section of the [employer] website at [email or web address] Remember, [employer] is NOT to be used for urgent needs. For medical emergencies, dial 911. Now available from your iPhone and Android! Please provide this summary of care documentation to your next provider. Your primary care clinician is listed as Min Murguia. If you have any questions after today's visit, please call [phone].

## 2017-10-25 NOTE — PROGRESS NOTES
Grisel Ortiz. 69 y.o. male     Mr. Brian Woo seen today for follow-up symptomatic BPH on alpha-blocker therapy with Flomax 0.4 mg daily-voiding with a solid stream good control nocturia once per night has history of gross hematuria secondary to  Coumadin excess-anticoagulated for coronary artery disease-patient has had no recurrent episodes of gross hematuria  gross hematuria-on Coumadin for coronary artery disease, peripheral vascular disease, and chronic atrial fibrillation  Patient has burgundy colored urine for the past 3 days-no urgency frequency or dysuria no fever flank pain      Similar to gross hematuria experienced in 2016 with CT scan in July 2016 showing normal kidneys and ureters and cystoscopy showing ingested and friable blood vessels in the bladder outle  Abdominal aneurysm found at that time subsequently stented in November 2016 by Dr. Jonatan Mckenzie  On alpha-blocker treatment with Flomax for relief of symptomatic BPH           symptomatic BPH initiating alpha-blocker therapy 3 months ago after evaluation of gross hematuria finding an abdominal aortic aneurysm in bilateral iliac aneurysms on CT scan imaging-refer to vascular surgery which has stented the abnormal blood vessels-patient is anticoagulated with Coumadin  Reports forceful urinary stream with good control-nocturia has subsided entirely-very happy with alpha-blocker treatment  CT scan imaging of the abdomen and pelvis with 7 July 2016 shows normal kidneys ureters and bladder-saccular aneurysms of the abdominal aorta and common iliac arteries are noted incidentally-Images have been reviewed today with the patient on PACS-Downloaded for scanning into the electronic medical record-laser printed for the patient      Patient has no symptoms of claudication-no episodes of abdominal pain      Creatinine 0.7 on 7 July 2016  PSA 0.5 in March 2016  PSA 0.4 on 6 June 2017      Review of Systems:    CNS: no seizure syncope headaches or dizziness  Respiratory: wheezing no shortness of breath/COPD  Cardiovascular:coronary artery disease/hypertension  Intestinal:GERD no diarrhea no constipation  Urinary: urinary urgency and frequency  Skeletal: large joint arthritis  Endocrine:diabetes x20 years  Other:massive obesity                                     Nonsmoker x25 years                    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 Indications: Symptomatic Benign Prostatic Hyperplasia 90 Cap 3    MATZIM  mg Tb24 tablet TK 1 T PO QD  3    tamsulosin (FLOMAX) 0.4 mg capsule Take 1 Cap by mouth daily. Indications: SYMPTOMATIC BENIGN PROSTATIC HYPERPLASIA 90 Cap 3    diltiazem CD (CARDIZEM CD) 180 mg ER capsule Take 180 mg by mouth daily.  lisinopril (PRINIVIL, ZESTRIL) 10 mg tablet 10 mg.      isosorbide mononitrate ER (IMDUR) 60 mg CR tablet 60 mg.      citalopram (CELEXA) 20 mg tablet   0    pravastatin (PRAVACHOL) 40 mg tablet 40 mg.      ranitidine (ZANTAC) 150 mg tablet   0    gemfibrozil (LOPID) 600 mg tablet 600 mg.      metFORMIN (GLUCOPHAGE) 1,000 mg tablet 1,000 mg.      metoprolol tartrate (LOPRESSOR) 25 mg tablet 25 mg.  warfarin (COUMADIN) 5 mg tablet       albuterol (PROVENTIL HFA, VENTOLIN HFA, PROAIR HFA) 90 mcg/actuation inhaler 2 Puffs.  multivitamin (ONE A DAY) tablet Take 1 Tab by mouth daily.  omega-3s-dha-epa-fish oil 300-1,000 mg cap Take  by mouth.  zinc 50 mg tab tablet Take  by mouth daily.       ascorbic acid, vitamin C, (VITAMIN C) 500 mg tablet Take 1,000 mg by mouth.  HYDROcodone-acetaminophen (NORCO) 5-325 mg per tablet Take 1 Tab by mouth every four (4) hours as needed. Max Daily Amount: 6 Tabs. 40 Tab 0    furosemide (LASIX) 40 mg tablet 40 mg.      celecoxib (CELEBREX) 200 mg capsule 200 mg.      gabapentin (NEURONTIN) 300 mg capsule Take 2 Caps by Mouth Every Night at Bedtime         Past Medical History:   Diagnosis Date    CAD (coronary artery disease)     Chronic obstructive pulmonary disease (HCC)     [facility]     Heart failure ([facility]     CHF    Hypertension     Sleep apnea     cpap    Thromboembolus ([facility]     Leg after sx-1991      Past Surgical History:   Procedure Laterality Date    CARDIAC SURG PROCEDURE UNLIST      CABGx4 1991    CHEST SURGERY PROCEDURE UNLISTED      HX OTHER SURGICAL      3 stents cardiac 1996    PELVIS/HIP JOINT SURGERY UNLISTED       Family History   Problem Relation Age of Onset    Heart Disease Father     Hypertension Father     Cancer Sister         Physical Examination: Well-nourished mature male in no apparent distress-increased BMI    Prostate by VANESSA large rounded smooth benign consistency and nontender-no induration no nodularity  No rectal masses induration or tenderness     Urinalysis: Dipstick negative nitrite negative heme negative     PVR today is 20 cc      Impression: Symptomatic BPH responding favorably to alpha-blocker Rx    Plan: PSA today            Flomax 0.4 mg daily #90 refill ×3    rtc 1 yr PSA VANESSA PVR      More than 1/2 of this 15 minute visit was spent in counselling and coordination of care, as described above. Torsten Oconnor MD  -electronically signed-    PLEASE NOTE:  This document has been produced using voice recognition software. Unrecognized errors in transcription may be present.

## 2017-10-26 LAB
BILIRUB UR QL STRIP: NEGATIVE
GLUCOSE UR-MCNC: NEGATIVE MG/DL
KETONES P FAST UR STRIP-MCNC: NEGATIVE MG/DL
PH UR STRIP: 6 [PH] (ref 4.6–8)
PROT UR QL STRIP: NEGATIVE MG/DL
SP GR UR STRIP: 1.01 (ref 1–1.03)
UA UROBILINOGEN AMB POC: NORMAL (ref 0.2–1)
URINALYSIS CLARITY POC: CLEAR
URINALYSIS COLOR POC: YELLOW
URINE BLOOD POC: NEGATIVE
URINE LEUKOCYTES POC: NEGATIVE
URINE NITRITES POC: NEGATIVE

## 2018-07-12 DIAGNOSIS — R35.1 BPH ASSOCIATED WITH NOCTURIA: ICD-10-CM

## 2018-07-12 DIAGNOSIS — N40.1 BPH ASSOCIATED WITH NOCTURIA: ICD-10-CM

## 2018-07-12 RX ORDER — TAMSULOSIN HYDROCHLORIDE 0.4 MG/1
CAPSULE ORAL
Qty: 90 CAP | Refills: 0 | Status: SHIPPED | OUTPATIENT
Start: 2018-07-12 | End: 2018-10-18 | Stop reason: SDUPTHER

## 2018-10-18 DIAGNOSIS — N40.1 BPH ASSOCIATED WITH NOCTURIA: ICD-10-CM

## 2018-10-18 DIAGNOSIS — R35.1 BPH ASSOCIATED WITH NOCTURIA: ICD-10-CM

## 2018-10-18 RX ORDER — TAMSULOSIN HYDROCHLORIDE 0.4 MG/1
CAPSULE ORAL
Qty: 90 CAP | Refills: 0 | Status: SHIPPED | OUTPATIENT
Start: 2018-10-18 | End: 2018-10-24 | Stop reason: SDUPTHER

## 2018-10-24 ENCOUNTER — OFFICE VISIT (OUTPATIENT)
Dept: UROLOGY | Age: 78
End: 2018-10-24

## 2018-10-24 ENCOUNTER — HOSPITAL ENCOUNTER (OUTPATIENT)
Dept: LAB | Age: 78
Discharge: HOME | End: 2018-10-24
Payer: MEDICARE

## 2018-10-24 VITALS
HEART RATE: 58 BPM | WEIGHT: 273 LBS | HEIGHT: 67 IN | TEMPERATURE: 97.6 F | SYSTOLIC BLOOD PRESSURE: 140 MMHG | OXYGEN SATURATION: 92 % | BODY MASS INDEX: 42.85 KG/M2 | DIASTOLIC BLOOD PRESSURE: 70 MMHG

## 2018-10-24 DIAGNOSIS — N40.1 BPH ASSOCIATED WITH NOCTURIA: ICD-10-CM

## 2018-10-24 DIAGNOSIS — R35.1 BPH ASSOCIATED WITH NOCTURIA: ICD-10-CM

## 2018-10-24 DIAGNOSIS — N40.1 BPH ASSOCIATED WITH NOCTURIA: Primary | ICD-10-CM

## 2018-10-24 DIAGNOSIS — R35.1 BPH ASSOCIATED WITH NOCTURIA: Primary | ICD-10-CM

## 2018-10-24 PROBLEM — E66.01 OBESITY, MORBID (HCC): Status: ACTIVE | Noted: 2018-10-24

## 2018-10-24 LAB
BILIRUB UR QL STRIP: NEGATIVE
GLUCOSE UR-MCNC: NEGATIVE MG/DL
KETONES P FAST UR STRIP-MCNC: NEGATIVE MG/DL
PH UR STRIP: 6 [PH] (ref 4.6–8)
PROT UR QL STRIP: NEGATIVE
PSA SERPL-MCNC: 0.4 NG/ML (ref 0–4)
SP GR UR STRIP: 1.02 (ref 1–1.03)
UA UROBILINOGEN AMB POC: NORMAL (ref 0.2–1)
URINALYSIS CLARITY POC: CLEAR
URINALYSIS COLOR POC: YELLOW
URINE BLOOD POC: NEGATIVE
URINE LEUKOCYTES POC: NEGATIVE
URINE NITRITES POC: NEGATIVE

## 2018-10-24 PROCEDURE — 84153 ASSAY OF PSA TOTAL: CPT | Performed by: UROLOGY

## 2018-10-24 RX ORDER — TAMSULOSIN HYDROCHLORIDE 0.4 MG/1
CAPSULE ORAL
Qty: 90 CAP | Refills: 0 | Status: SHIPPED | OUTPATIENT
Start: 2018-10-24

## 2018-10-24 RX ORDER — TAMSULOSIN HYDROCHLORIDE 0.4 MG/1
0.4 CAPSULE ORAL DAILY
Qty: 30 CAP | Refills: 3 | Status: SHIPPED | OUTPATIENT
Start: 2018-10-24 | End: 2019-08-09 | Stop reason: SDUPTHER

## 2018-10-24 NOTE — PATIENT INSTRUCTIONS
Benign Prostatic Hyperplasia: Care Instructions  Your Care Instructions    Benign prostatic hyperplasia, or BPH, is an enlarged prostate gland. The prostate is a small gland that makes some of the fluid in semen. Prostate enlargement happens to almost all men as they age. It is usually not serious. BPH does not cause prostate cancer. As the prostate gets bigger, it may partly block the flow of urine. You may have a hard time getting a urine stream started or completely stopped. BPH can cause dribbling. You may have a weak urine stream, or you may have to urinate more often than you used to, especially at night. Most men find these problems easy to manage. You do not need treatment unless your symptoms bother you a lot or you have other problems, such as bladder infections or stones. In these cases, medicines may help. Surgery is not needed unless the urine flow is blocked or the symptoms do not get better with medicine. Follow-up care is a key part of your treatment and safety. Be sure to make and go to all appointments, and call your doctor if you are having problems. It's also a good idea to know your test results and keep a list of the medicines you take. How can you care for yourself at home? · Take plenty of time to urinate. Try to relax. · Try \"double voiding. \" Urinate as much you can, relax for a few moments, and then try to urinate again. · Sit on the toilet to urinate. · Read or think of other things while you are waiting. · Turn on a faucet, or try to picture running water. Some men find that this helps get their urine flowing. · If dribbling is a problem, wash your penis daily to avoid skin irritation and infection. · Avoid caffeine and alcohol. These drinks will increase how often you need to urinate. Spread your fluid intake throughout the day. If the urge to urinate often wakes you at night, limit your fluid intake in the evening. Urinate right before you go to bed.   · Many over-the-counter cold and allergy medicines can make the symptoms of BPH worse. Avoid antihistamines, decongestants, and allergy pills, if you can. Read the warnings on the package. · If you take any prescription medicines, especially tranquilizers or antidepressants, ask your doctor or pharmacist whether they can cause urination problems. There may be other medicines you can use that do not cause urinary problems. · Be safe with medicines. Take your medicines exactly as prescribed. Call your doctor if you think you are having a problem with your medicine. When should you call for help? Call your doctor now or seek immediate medical care if:    · You cannot urinate at all.     · You have symptoms of a urinary infection. For example:  ? You have blood or pus in your urine. ? You have pain in your back just below your rib cage. This is called flank pain. ? You have a fever, chills, or body aches. ? It hurts to urinate. ? You have groin or belly pain.    Watch closely for changes in your health, and be sure to contact your doctor if:    · It hurts when you ejaculate.     · Your urinary problems get a lot worse or bother you a lot. Where can you learn more? Go to http://jasmin-jose juan.info/. Enter Z960 in the search box to learn more about \"Benign Prostatic Hyperplasia: Care Instructions. \"  Current as of: December 3, 2017  Content Version: 11.8  © 3035-8184 [employer]. Care instructions adapted under license by [employer] (which disclaims liability or warranty for this information). If you have questions about a medical condition or this instruction, always ask your healthcare professional. [address] any warranty or liability for your use of this information.

## 2018-10-24 NOTE — PROGRESS NOTES
Mavis Tavarez. 69 y.o. male     Mr. Felix Franklin seen today for symptomatic BPH follow-up  Currently on alpha-blocker therapy with Flomax 0.4 mg daily  Patient is voiding with a solid stream good control nocturia 0-1 episodes per night  No complaints regarding urination at this time  Coumadin excess-anticoagulated for coronary artery disease-patient has had no recurrent episodes of gross hematuria  gross hematuria-on Coumadin for coronary artery disease, peripheral vascular disease, and chronic atrial fibrillation  Patient has burgundy colored urine for the past 3 days-no urgency frequency or dysuria no fever flank pain      Similar to gross hematuria experienced in 2016 with CT scan in July 2016 showing normal kidneys and ureters and cystoscopy showing ingested and friable blood vessels in the bladder outle  Abdominal aneurysm found at that time subsequently stented in November 2016 by Dr. Dagoberto Lai  On alpha-blocker treatment with Flomax for relief of symptomatic BPH           symptomatic BPH initiating alpha-blocker therapy 3 months ago after evaluation of gross hematuria finding an abdominal aortic aneurysm in bilateral iliac aneurysms on CT scan imaging-refer to vascular surgery which has stented the abnormal blood vessels-patient is anticoagulated with Coumadin  Reports forceful urinary stream with good control-nocturia has subsided entirely-very happy with alpha-blocker treatment  CT scan imaging of the abdomen and pelvis with 7 July 2016 shows normal kidneys ureters and bladder-saccular aneurysms of the abdominal aorta and common iliac arteries are noted incidentally-Images have been reviewed today with the patient on PACS-Downloaded for scanning into the electronic medical record-laser printed for the patient      Patient has no symptoms of claudication-no episodes of abdominal pain      Creatinine 0.7 on 7 July 2016  PSA 0.5 in March 2016  PSA 0.4 on 6 June 2017      Review of Systems:    CNS: no seizure syncope headaches or dizziness  Respiratory: wheezing no shortness of breath/COPD  Cardiovascular:coronary artery disease/hypertension  Intestinal:GERD no diarrhea no constipation  Urinary: urinary urgency and frequency  Skeletal: large joint arthritis  Endocrine:diabetes x20 years  Other:massive obesity                                     Nonsmoker x25 years                    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 Indications: SYMPTOMATIC BENIGN PROSTATIC HYPERPLASIA 90 Cap 3    diltiazem CD (CARDIZEM CD) 180 mg ER capsule Take 180 mg by mouth daily.  furosemide (LASIX) 40 mg tablet 40 mg.      lisinopril (PRINIVIL, ZESTRIL) 10 mg tablet 10 mg.      isosorbide mononitrate ER (IMDUR) 60 mg CR tablet 60 mg.      citalopram (CELEXA) 20 mg tablet   0    pravastatin (PRAVACHOL) 40 mg tablet 40 mg.      ranitidine (ZANTAC) 150 mg tablet   0    gemfibrozil (LOPID) 600 mg tablet 600 mg.      metFORMIN (GLUCOPHAGE) 1,000 mg tablet 1,000 mg.      metoprolol tartrate (LOPRESSOR) 25 mg tablet 25 mg.      gabapentin (NEURONTIN) 300 mg capsule Take 2 Caps by Mouth Every Night at Bedtime      warfarin (COUMADIN) 5 mg tablet       multivitamin (ONE A DAY) tablet Take 1 Tab by mouth daily.  omega-3s-dha-epa-fish oil 300-1,000 mg cap Take  by mouth.  zinc 50 mg tab tablet Take  by mouth daily.  ascorbic acid, vitamin C, (VITAMIN C) 500 mg tablet Take 1,000 mg by mouth.       tamsulosin (FLOMAX) 0.4 mg capsule TAKE 1 CAPSULE BY MOUTH EVERY DAY 90 Cap 0    HYDROcodone-acetaminophen (NORCO) 5-325 mg per tablet Take 1 Tab by mouth every four (4) hours as needed. Max Daily Amount: 6 Tabs. 40 Tab 0    celecoxib (CELEBREX) 200 mg capsule 200 mg.      albuterol (PROVENTIL HFA, VENTOLIN HFA, PROAIR HFA) 90 mcg/actuation inhaler 2 Puffs. Past Medical History:   Diagnosis Date    CAD (coronary artery disease)     [facility] [address]     Diabetes [facility] [address]     Heart failure (HCC)     CHF    Hypertension     Sleep apnea     cpap    Thromboembolus ([facility] [address]     Leg after sx-      Past Surgical History:   Procedure Laterality Date    CARDIAC SURG PROCEDURE UNLIST      CABGx4     CHEST SURGERY PROCEDURE UNLISTED      HX OTHER SURGICAL      3 stents cardiac     PELVIS/HIP JOINT SURGERY UNLISTED       Social History     Socioeconomic History    Marital status:      Spouse name: Not on file    Number of children: Not on file    Years of education: Not on file    Highest education level: Not on file   Social Needs    Financial resource strain: Not on file    Food insecurity - worry: Not on file    Food insecurity - inability: Not on file   [employer] needs - medical: Not on file   "[employer] needs - non-medical: Not on file   Occupational History    Not on file   Tobacco Use    Smoking status: Former Smoker     Last attempt to quit: 1991     Years since quittin.8    Smokeless tobacco: Never Used   Substance and Sexual Activity    Alcohol use:  Yes    Drug use: No    Sexual activity: Yes   Other Topics Concern    Not on file   Social History Narrative    Not on file      Family History   Problem Relation Age of Onset    Heart Disease Father     Hypertension Father     Cancer Sister         Physical Examination: Well-nourished mature male in no apparent distress marked increased BMI    Prostate by VANESSA is large rounded smooth benign consistency and nontender-no induration no nodularity   No rectal masses induration or tenderness    urinalysis: Negative dipstick/nitrite negative    Impression: Symptomatic BPH responding favorably to alpha-blocker Rx        Plan: Flomax 0.4 mg daily 90 refill x3    PSA today    RTC 1 year PSA VANESSA PVR      More than 1/2 of this 15minute visit was spent in counselling and coordination of care, as described above. Alisson Santa MD  -electronically signed-    PLEASE NOTE:  This document has been produced using voice recognition software. Unrecognized errors in transcription may be present.

## 2018-10-24 NOTE — PROGRESS NOTES
Mr. Pierce Goodell has a reminder for a \"due or due soon\" health maintenance. I have asked that he contact his primary care provider for follow-up on this health maintenance. RBV Per Dr. Lyudmila Luna draw lab today for PSA for BPH with Nocturia.

## 2019-08-09 DIAGNOSIS — R35.1 BPH ASSOCIATED WITH NOCTURIA: ICD-10-CM

## 2019-08-09 DIAGNOSIS — N40.1 BPH ASSOCIATED WITH NOCTURIA: ICD-10-CM

## 2019-08-11 RX ORDER — TAMSULOSIN HYDROCHLORIDE 0.4 MG/1
CAPSULE ORAL
Qty: 90 CAP | Refills: 1 | Status: SHIPPED | OUTPATIENT
Start: 2019-08-11

## 2019-10-24 ENCOUNTER — HOSPITAL ENCOUNTER (OUTPATIENT)
Dept: LAB | Age: 79
Discharge: HOME | End: 2019-10-24
Payer: MEDICARE

## 2019-10-24 ENCOUNTER — OFFICE VISIT (OUTPATIENT)
Dept: UROLOGY | Age: 79
End: 2019-10-24

## 2019-10-24 VITALS
OXYGEN SATURATION: 98 % | BODY MASS INDEX: 42.85 KG/M2 | DIASTOLIC BLOOD PRESSURE: 72 MMHG | WEIGHT: 273 LBS | SYSTOLIC BLOOD PRESSURE: 100 MMHG | HEART RATE: 50 BPM | HEIGHT: 67 IN

## 2019-10-24 DIAGNOSIS — N40.1 BPH WITH OBSTRUCTION/LOWER URINARY TRACT SYMPTOMS: ICD-10-CM

## 2019-10-24 DIAGNOSIS — N13.8 BPH WITH OBSTRUCTION/LOWER URINARY TRACT SYMPTOMS: Primary | ICD-10-CM

## 2019-10-24 DIAGNOSIS — N40.1 BPH WITH OBSTRUCTION/LOWER URINARY TRACT SYMPTOMS: Primary | ICD-10-CM

## 2019-10-24 DIAGNOSIS — N13.8 BPH WITH OBSTRUCTION/LOWER URINARY TRACT SYMPTOMS: ICD-10-CM

## 2019-10-24 LAB
BILIRUB UR QL STRIP: NEGATIVE
GLUCOSE UR-MCNC: NEGATIVE MG/DL
KETONES P FAST UR STRIP-MCNC: NEGATIVE MG/DL
PH UR STRIP: 7 [PH] (ref 4.6–8)
PROT UR QL STRIP: NEGATIVE
SP GR UR STRIP: 1.02 (ref 1–1.03)
UA UROBILINOGEN AMB POC: NORMAL (ref 0.2–1)
URINALYSIS CLARITY POC: CLEAR
URINALYSIS COLOR POC: YELLOW
URINE BLOOD POC: NEGATIVE
URINE LEUKOCYTES POC: NEGATIVE
URINE NITRITES POC: NEGATIVE

## 2019-10-24 PROCEDURE — 84153 ASSAY OF PSA TOTAL: CPT

## 2019-10-24 NOTE — PROGRESS NOTES
Mr. Alejandra Smith has a reminder for a \"due or due soon\" health maintenance. I have asked that he contact his primary care provider for follow-up on this health maintenance.

## 2019-10-24 NOTE — PATIENT INSTRUCTIONS
Benign Prostatic Hyperplasia: Care Instructions  Your Care Instructions    Benign prostatic hyperplasia, or BPH, is an enlarged prostate gland. The prostate is a small gland that makes some of the fluid in semen. Prostate enlargement happens to almost all men as they age. It is usually not serious. BPH does not cause prostate cancer. As the prostate gets bigger, it may partly block the flow of urine. You may have a hard time getting a urine stream started or completely stopped. BPH can cause dribbling. You may have a weak urine stream, or you may have to urinate more often than you used to, especially at night. Most men find these problems easy to manage. You do not need treatment unless your symptoms bother you a lot or you have other problems, such as bladder infections or stones. In these cases, medicines may help. Surgery is not needed unless the urine flow is blocked or the symptoms do not get better with medicine. Follow-up care is a key part of your treatment and safety. Be sure to make and go to all appointments, and call your doctor if you are having problems. It's also a good idea to know your test results and keep a list of the medicines you take. How can you care for yourself at home? · Take plenty of time to urinate. Try to relax. · Try \"double voiding. \" Urinate as much you can, relax for a few moments, and then try to urinate again. · Sit on the toilet to urinate. · Read or think of other things while you are waiting. · Turn on a faucet, or try to picture running water. Some men find that this helps get their urine flowing. · If dribbling is a problem, wash your penis daily to avoid skin irritation and infection. · Avoid caffeine and alcohol. These drinks will increase how often you need to urinate. Spread your fluid intake throughout the day. If the urge to urinate often wakes you at night, limit your fluid intake in the evening. Urinate right before you go to bed.   · Many over-the-counter cold and allergy medicines can make the symptoms of BPH worse. Avoid antihistamines, decongestants, and allergy pills, if you can. Read the warnings on the package. · If you take any prescription medicines, especially tranquilizers or antidepressants, ask your doctor or pharmacist whether they can cause urination problems. There may be other medicines you can use that do not cause urinary problems. · Be safe with medicines. Take your medicines exactly as prescribed. Call your doctor if you think you are having a problem with your medicine. When should you call for help? Call your doctor now or seek immediate medical care if:    · You cannot urinate at all.     · You have symptoms of a urinary infection. For example:  ? You have blood or pus in your urine. ? You have pain in your back just below your rib cage. This is called flank pain. ? You have a fever, chills, or body aches. ? It hurts to urinate. ? You have groin or belly pain.    Watch closely for changes in your health, and be sure to contact your doctor if:    · It hurts when you ejaculate.     · Your urinary problems get a lot worse or bother you a lot. Where can you learn more? Go to http://jasmin-jose juan.info/. Enter B806 in the search box to learn more about \"Benign Prostatic Hyperplasia: Care Instructions. \"  Current as of: May 28, 2019  Content Version: 12.2  © 8072-4803 [employer]. Care instructions adapted under license by [employer] (which disclaims liability or warranty for this information). If you have questions about a medical condition or this instruction, always ask your healthcare professional. [address] any warranty or liability for your use of this information.

## 2019-10-25 LAB — PSA SERPL-MCNC: 0.4 NG/ML (ref 0–4)

## 2019-10-26 RX ORDER — TAMSULOSIN HYDROCHLORIDE 0.4 MG/1
0.4 CAPSULE ORAL DAILY
Qty: 30 CAP | Refills: 3 | Status: SHIPPED | OUTPATIENT
Start: 2019-10-26

## 2019-10-26 NOTE — PROGRESS NOTES
Praful Llanes. 67 y.o. male     Mr. Blas Rankin seen today for yearly follow-up evaluation symptomatic BPH on alpha-blocker therapy Flomax patient reports  Normal voiding with solid stream good control nocturia once per night tamsulosin 0.4 mg daily  No complaints regarding urination at this time    Coumadin anticoagulation for coronary artery disease-patient has had no recurrent episodes of gross hematuria  gross hematuria-on Coumadin for coronary artery disease, peripheral vascular disease, and chronic atrial fibrillation      gross hematuria experienced in 2016 with CT scan in July 2016 showing normal kidneys and ureters and cystoscopy showing injected and friable blood vessels in the bladder outle  Abdominal aneurysm found at that time subsequently stented in November 2016 by Dr. Paramjit Rousseau  symptomatic BPH initiating alpha-blocker in 2018 after evaluation of gross hematuria finding an abdominal aortic aneurysm in bilateral iliac aneurysms on CT scan imaging-refer to vascular surgery which has stented the abnormal blood vessels Reports forceful urinary stream with good control-nocturia has subsided entirely-very happy with alpha-blocker treatment  CT scan imaging of the abdomen and pelvis with 7 July 2016 shows normal kidneys ureters and bladder-saccular aneurysms of the abdominal aorta and common iliac arteries are noted incidentally-Images have been reviewed today with the patient on PACS-Downloaded for scanning into the electronic medical record-laser printed for the patient      Patient has no symptoms of claudication-no episodes of abdominal pain      Creatinine 0.7 on 7 July 2016  PSA 0.5 in March 2016  PSA 0.4 on 6 June 2017  PSA 0.4 in October 2018    PVR 5 cc in October 2019      Review of Systems:    CNS: no seizure syncope headaches or dizziness  Respiratory: wheezing no shortness of breath/COPD  Cardiovascular:coronary artery disease/hypertension  Intestinal:GERD no diarrhea no constipation  Urinary: urinary urgency and frequency  Skeletal: large joint arthritis  Endocrine:diabetes x20 years  Other:massive obesity                                     Nonsmoker x25 years                    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 Indications: SYMPTOMATIC BENIGN PROSTATIC HYPERPLASIA 90 Cap 3    diltiazem CD (CARDIZEM CD) 180 mg ER capsule Take 180 mg by mouth daily.  furosemide (LASIX) 40 mg tablet 40 mg.      lisinopril (PRINIVIL, ZESTRIL) 10 mg tablet 10 mg.      isosorbide mononitrate ER (IMDUR) 60 mg CR tablet 60 mg.      citalopram (CELEXA) 20 mg tablet   0    pravastatin (PRAVACHOL) 40 mg tablet 40 mg.      ranitidine (ZANTAC) 150 mg tablet   0    celecoxib (CELEBREX) 200 mg capsule 200 mg.      gemfibrozil (LOPID) 600 mg tablet 600 mg.      metFORMIN (GLUCOPHAGE) 1,000 mg tablet 1,000 mg.      metoprolol tartrate (LOPRESSOR) 25 mg tablet 25 mg.      gabapentin (NEURONTIN) 300 mg capsule Take 2 Caps by Mouth Every Night at Bedtime      warfarin (COUMADIN) 5 mg tablet       multivitamin (ONE A DAY) tablet Take 1 Tab by mouth daily.  omega-3s-dha-epa-fish oil 300-1,000 mg cap Take  by mouth.  zinc 50 mg tab tablet Take  by mouth daily.  ascorbic acid, vitamin C, (VITAMIN C) 500 mg tablet Take 1,000 mg by mouth.       tamsulosin (FLOMAX) 0.4 mg capsule TAKE 1 CAPSULE BY MOUTH EVERY DAY 90 Cap 1    tamsulosin (FLOMAX) 0.4 mg capsule TAKE 1 CAPSULE BY MOUTH EVERY DAY 90 Cap 0    HYDROcodone-acetaminophen (NORCO) 5-325 mg per tablet Take 1 Tab by mouth every four (4) hours as needed. Max Daily Amount: 6 Tabs. 40 Tab 0    albuterol (PROVENTIL HFA, VENTOLIN HFA, PROAIR HFA) 90 mcg/actuation inhaler 2 Puffs. Past Medical History:   Diagnosis Date    CAD (coronary artery disease)     [facility] obstructive [facility] ([facility] [address]     Diabetes ([facility] [address]     Heart failure (HCC)     CHF    Hypertension     Sleep apnea     cpap    Thromboembolus (HCC)     Leg after sx-      Past Surgical History:   Procedure Laterality Date    CARDIAC SURG PROCEDURE UNLIST      CABGx4     CHEST SURGERY PROCEDURE UNLISTED      HX OTHER SURGICAL      3 stents cardiac     PELVIS/HIP JOINT SURGERY UNLISTED       Social History     Socioeconomic History    Marital status:      Spouse name: Not on file    Number of children: Not on file    Years of education: Not on file    Highest education level: Not on file   Occupational History    Not on file   Social Needs    Financial resource strain: Not on file    Food insecurity:     Worry: Not on file     Inability: Not on file    Transportation needs:     Medical: Not on file     Non-medical: Not on file   Tobacco Use    Smoking status: Former Smoker     Last attempt to quit: 1991     Years since quittin.8    Smokeless tobacco: Never Used   Substance and Sexual Activity    Alcohol use:  Yes    Drug use: No    Sexual activity: Yes   Lifestyle    Physical activity:     Days per week: Not on file     Minutes per session: Not on file    Stress: Not on file   Relationships    Social connections:     Talks on phone: Not on file     Gets together: Not on file     Attends [employer] service: Not on file     Active member of club or organization: Not on file     Attends meetings of clubs or organizations: Not on file     Relationship status: Not on file    Intimate partner violence:     Fear of current or ex partner: Not on file     Emotionally abused: Not on file     Physically abused: Not on file     Forced sexual activity: Not on file   Other Topics Concern    Not on file   Social History Narrative    Not on file      Family History   Problem Relation Age of Onset    Heart Disease Father     Hypertension Father     Cancer Sister         Physical Examination: Well-nourished mature male in no apparent distress    Prostate by VANESSA is rounded, smooth, benign consistency and nontender-no induration no nodularity  No rectal masses induration or tenderness      Urinalysis: Negative dipstick/nitrite negative/heme-negative    Impression: Symptomatic BPH responding favorably to alpha-blocker Rx    Plan: Flomax 0.4 mg daily 90 refill x3    PSA today    RTC 1 year PSA VANESSA PVR      More than 1/2 of this 15 minute visit was spent in counselling and coordination of care, as described above. Alla Pat MD  -electronically signed-    PLEASE NOTE:  This document has been produced using voice recognition software. Unrecognized errors in transcription may be present.